# Patient Record
Sex: FEMALE | Race: ASIAN | NOT HISPANIC OR LATINO | URBAN - METROPOLITAN AREA
[De-identification: names, ages, dates, MRNs, and addresses within clinical notes are randomized per-mention and may not be internally consistent; named-entity substitution may affect disease eponyms.]

---

## 2022-12-03 ENCOUNTER — EMERGENCY (EMERGENCY)
Facility: HOSPITAL | Age: 37
LOS: 1 days | Discharge: HOME | End: 2022-12-03
Attending: EMERGENCY MEDICINE | Admitting: EMERGENCY MEDICINE

## 2022-12-03 VITALS
RESPIRATION RATE: 16 BRPM | WEIGHT: 166.01 LBS | OXYGEN SATURATION: 96 % | DIASTOLIC BLOOD PRESSURE: 90 MMHG | SYSTOLIC BLOOD PRESSURE: 138 MMHG | HEART RATE: 88 BPM | TEMPERATURE: 98 F

## 2022-12-03 VITALS
SYSTOLIC BLOOD PRESSURE: 125 MMHG | TEMPERATURE: 98 F | HEART RATE: 85 BPM | RESPIRATION RATE: 16 BRPM | OXYGEN SATURATION: 95 % | DIASTOLIC BLOOD PRESSURE: 85 MMHG

## 2022-12-03 LAB
ALBUMIN SERPL ELPH-MCNC: 4.1 G/DL — SIGNIFICANT CHANGE UP (ref 3.4–5)
ALP SERPL-CCNC: 61 U/L — SIGNIFICANT CHANGE UP (ref 40–120)
ALT FLD-CCNC: 25 U/L — SIGNIFICANT CHANGE UP (ref 12–42)
ANION GAP SERPL CALC-SCNC: 8 MMOL/L — LOW (ref 9–16)
AST SERPL-CCNC: 19 U/L — SIGNIFICANT CHANGE UP (ref 15–37)
B PERT DNA SPEC QL NAA+PROBE: SIGNIFICANT CHANGE UP
BASOPHILS # BLD AUTO: 0.03 K/UL — SIGNIFICANT CHANGE UP (ref 0–0.2)
BASOPHILS NFR BLD AUTO: 0.5 % — SIGNIFICANT CHANGE UP (ref 0–2)
BILIRUB SERPL-MCNC: 0.7 MG/DL — SIGNIFICANT CHANGE UP (ref 0.2–1.2)
BUN SERPL-MCNC: 6 MG/DL — LOW (ref 7–23)
C PNEUM DNA SPEC QL NAA+PROBE: SIGNIFICANT CHANGE UP
CALCIUM SERPL-MCNC: 9.1 MG/DL — SIGNIFICANT CHANGE UP (ref 8.5–10.5)
CHLORIDE SERPL-SCNC: 103 MMOL/L — SIGNIFICANT CHANGE UP (ref 96–108)
CO2 SERPL-SCNC: 27 MMOL/L — SIGNIFICANT CHANGE UP (ref 22–31)
CREAT SERPL-MCNC: 0.74 MG/DL — SIGNIFICANT CHANGE UP (ref 0.5–1.3)
EGFR: 105 ML/MIN/1.73M2 — SIGNIFICANT CHANGE UP
EOSINOPHIL # BLD AUTO: 0.04 K/UL — SIGNIFICANT CHANGE UP (ref 0–0.5)
EOSINOPHIL NFR BLD AUTO: 0.6 % — SIGNIFICANT CHANGE UP (ref 0–6)
FLUAV AG NPH QL: SIGNIFICANT CHANGE UP
FLUAV H1 2009 PAND RNA SPEC QL NAA+PROBE: SIGNIFICANT CHANGE UP
FLUAV H1 RNA SPEC QL NAA+PROBE: SIGNIFICANT CHANGE UP
FLUAV H3 RNA SPEC QL NAA+PROBE: SIGNIFICANT CHANGE UP
FLUAV SUBTYP SPEC NAA+PROBE: SIGNIFICANT CHANGE UP
FLUBV AG NPH QL: SIGNIFICANT CHANGE UP
FLUBV RNA SPEC QL NAA+PROBE: SIGNIFICANT CHANGE UP
GLUCOSE SERPL-MCNC: 110 MG/DL — HIGH (ref 70–99)
HADV DNA SPEC QL NAA+PROBE: SIGNIFICANT CHANGE UP
HCG SERPL-ACNC: <1 MIU/ML — SIGNIFICANT CHANGE UP
HCOV PNL SPEC NAA+PROBE: SIGNIFICANT CHANGE UP
HCT VFR BLD CALC: 43.2 % — SIGNIFICANT CHANGE UP (ref 34.5–45)
HGB BLD-MCNC: 14.1 G/DL — SIGNIFICANT CHANGE UP (ref 11.5–15.5)
HMPV RNA SPEC QL NAA+PROBE: SIGNIFICANT CHANGE UP
HPIV1 RNA SPEC QL NAA+PROBE: SIGNIFICANT CHANGE UP
HPIV2 RNA SPEC QL NAA+PROBE: SIGNIFICANT CHANGE UP
HPIV3 RNA SPEC QL NAA+PROBE: SIGNIFICANT CHANGE UP
HPIV4 RNA SPEC QL NAA+PROBE: SIGNIFICANT CHANGE UP
IMM GRANULOCYTES NFR BLD AUTO: 0.3 % — SIGNIFICANT CHANGE UP (ref 0–0.9)
LIDOCAIN IGE QN: 103 U/L — SIGNIFICANT CHANGE UP (ref 73–393)
LYMPHOCYTES # BLD AUTO: 1.38 K/UL — SIGNIFICANT CHANGE UP (ref 1–3.3)
LYMPHOCYTES # BLD AUTO: 21.6 % — SIGNIFICANT CHANGE UP (ref 13–44)
MCHC RBC-ENTMCNC: 30.3 PG — SIGNIFICANT CHANGE UP (ref 27–34)
MCHC RBC-ENTMCNC: 32.6 GM/DL — SIGNIFICANT CHANGE UP (ref 32–36)
MCV RBC AUTO: 92.7 FL — SIGNIFICANT CHANGE UP (ref 80–100)
MONOCYTES # BLD AUTO: 0.37 K/UL — SIGNIFICANT CHANGE UP (ref 0–0.9)
MONOCYTES NFR BLD AUTO: 5.8 % — SIGNIFICANT CHANGE UP (ref 2–14)
NEUTROPHILS # BLD AUTO: 4.55 K/UL — SIGNIFICANT CHANGE UP (ref 1.8–7.4)
NEUTROPHILS NFR BLD AUTO: 71.2 % — SIGNIFICANT CHANGE UP (ref 43–77)
NRBC # BLD: 0 /100 WBCS — SIGNIFICANT CHANGE UP (ref 0–0)
NT-PROBNP SERPL-SCNC: 33 PG/ML — SIGNIFICANT CHANGE UP
PLATELET # BLD AUTO: 407 K/UL — HIGH (ref 150–400)
POTASSIUM SERPL-MCNC: 3.7 MMOL/L — SIGNIFICANT CHANGE UP (ref 3.5–5.3)
POTASSIUM SERPL-SCNC: 3.7 MMOL/L — SIGNIFICANT CHANGE UP (ref 3.5–5.3)
PROT SERPL-MCNC: 7.6 G/DL — SIGNIFICANT CHANGE UP (ref 6.4–8.2)
RAPID RVP RESULT: SIGNIFICANT CHANGE UP
RBC # BLD: 4.66 M/UL — SIGNIFICANT CHANGE UP (ref 3.8–5.2)
RBC # FLD: 12 % — SIGNIFICANT CHANGE UP (ref 10.3–14.5)
RSV RNA NPH QL NAA+NON-PROBE: SIGNIFICANT CHANGE UP
RSV RNA SPEC QL NAA+PROBE: SIGNIFICANT CHANGE UP
RV+EV RNA SPEC QL NAA+PROBE: SIGNIFICANT CHANGE UP
SARS-COV-2 RNA SPEC QL NAA+PROBE: SIGNIFICANT CHANGE UP
SARS-COV-2 RNA SPEC QL NAA+PROBE: SIGNIFICANT CHANGE UP
SODIUM SERPL-SCNC: 138 MMOL/L — SIGNIFICANT CHANGE UP (ref 132–145)
TROPONIN I, HIGH SENSITIVITY RESULT: 5.1 NG/L — SIGNIFICANT CHANGE UP
TSH SERPL-MCNC: 4.34 UIU/ML — HIGH (ref 0.36–3.74)
WBC # BLD: 6.39 K/UL — SIGNIFICANT CHANGE UP (ref 3.8–10.5)
WBC # FLD AUTO: 6.39 K/UL — SIGNIFICANT CHANGE UP (ref 3.8–10.5)

## 2022-12-03 PROCEDURE — 71046 X-RAY EXAM CHEST 2 VIEWS: CPT | Mod: 26

## 2022-12-03 PROCEDURE — 70491 CT SOFT TISSUE NECK W/DYE: CPT | Mod: 26

## 2022-12-03 PROCEDURE — 99284 EMERGENCY DEPT VISIT MOD MDM: CPT | Mod: 25

## 2022-12-03 PROCEDURE — 93010 ELECTROCARDIOGRAM REPORT: CPT

## 2022-12-03 NOTE — ED PROVIDER NOTE - PROGRESS NOTE DETAILS
CT shows no thyroid abn.  Shows mild tonsillar swelling, mild salivary gland swelling.  Likely reactive LN on R side.  Will refer to ENT per pt preference as she is still concerned about chronic snoring which has been going on since the summer.  Mono test, RVP, and strep culture sent.  Stable for dc.

## 2022-12-03 NOTE — ED ADULT NURSE NOTE - OBJECTIVE STATEMENT
Pt walked in c/o of "enlarged thyroid" x " since the summer after I got sick with the cold". Pt reports seeing pcp yesterday and was told to come to the ED for any concerns. Pt arrives today reporting bilateral numbness to the hands this morning "because of my anxiety. I think I have cancer". Numbness has subsided. Pt denies never/chills, sob, cp, n/v/d, headache/dizziness. BEFAST negative.

## 2022-12-03 NOTE — ED ADULT TRIAGE NOTE - CHIEF COMPLAINT QUOTE
Pt reports waking up this morning c/o b/l feet swelling, and arm tingling, recently saw her pmd and is concerned about abnormal test results and enlarged thyroid, cough x 1 month

## 2022-12-03 NOTE — ED PROVIDER NOTE - OBJECTIVE STATEMENT
38 y/o F with no PMH presents to the ED for multiple medical complaints. Pt reports she was having difficulty breathing and swallowing 2 nights ago. She saw her PCP who told her that her thyroid appeared to be swollen. Pt had blood work done at the time and results returned this morning that showed an "abnormal value for platelets." She was experiencing B/L hand tingling this morning and was concerned for a "blood clot." Pt went to the ED afterwards for evaluation. Pt also states she has been having coughs and some shortness of breath that has not resolved since onset about 2-3 weeks ago. Pt denies fevers, chills, CP, or LE edema.

## 2022-12-03 NOTE — ED PROVIDER NOTE - PATIENT PORTAL LINK FT
You can access the FollowMyHealth Patient Portal offered by [facility] by registering at the following website: [email or web address]. By joining [employer]’s FollowMyHealth portal, you will also be able to view your health information using other applications (apps) compatible with our system.

## 2022-12-03 NOTE — ED PROVIDER NOTE - NSFOLLOWUPINSTRUCTIONS_ED_ALL_ED_FT
Follow up with ENT for further evaluation.      Tonsillitis    An open mouth with the tongue lifted showing the tonsils.   Tonsillitis is an infection of the throat. Tonsils are tissues in the back of your throat. This infection causes the tonsils to become red, tender, and swollen.      What are the causes?    Tonsillitis is caused by germs (bacteria or a virus). This condition can also occur when pieces of food and bacteria build up around the tonsils.    Tonsillitis that is caused by germs can spread from person to person.      What are the signs or symptoms?    •A sore throat.       •Trouble swallowing.       •White patches on the tonsils.       •Swollen tonsils.       •Fever.       •Headache.       •Tiredness.       •Not feeling hungry.      • Snoring during sleep when you did not snore before.       •Foul-smelling, yellowish-white pieces of material that you cough up or spit out. These can cause bad breath.        How is this treated?    •Medicines. These can be given to treat pain, swelling, or fever. They can also be given to kill bacteria.      •Surgery to take out the tonsils. This is done if you have very bad infections that do not go away.        Follow these instructions at home:    Medicines     •Take over-the-counter and prescription medicines only as told by your doctor.      •If you were prescribed an antibiotic medicine, take it as told by your doctor. Do not stop taking the antibiotic even if you start to feel better.      Eating and drinking     •Drink enough fluid to keep your pee (urine) pale yellow.    •While your throat is sore, eat soft or liquid foods, such as:  •Soup.      •Sherbet.      •Soft, warm cereals, such as oatmeal or hot wheat cereal.        •Drink warm fluids.      •Eat frozen ice pops.      General instructions     •Rest as much as you can, and get plenty of sleep.    •Rinse your mouth often with salt water.   •To make salt water, dissolve ½–1 tsp (3–6 g) of salt in 1 cup (237 mL) of warm water.      •Do not swallow the salt water.        •Wash your hands often with soap and water for at least 20 seconds. If there is no soap and water, use hand .      • Do not share cups, bottles, or other utensils until your symptoms are gone.      • Do not smoke or use any products that contain nicotine or tobacco. If you need help quitting, ask your doctor.      •Keep all follow-up visits.        Contact a doctor if:    •You have large, tender lumps in your neck that are new.      •You have a fever that does not go away after 2–3 days.      •You have a rash.      •You cough up green, yellow-brown, or bloody fluid.      •You cannot swallow liquids or food for 24 hours.      •Only one of your tonsils is swollen.        Get help right away if:  •You have any new symptoms such as:  •Vomiting.      •Very bad headache.      •Stiff neck.      •Chest pain.      •Trouble breathing or swallowing.        •You have very bad throat pain, and you also have drooling or voice changes.      •You have very bad pain that is not helped by medicine.      •You cannot fully open your mouth.      •You have redness, swelling, or very bad pain anywhere in your neck.        Summary    •Tonsillitis is an infection of the throat. It causes your tonsils to be red, tender, and swollen.      •While your throat is sore, eat soft or liquid foods.      •Rinse your mouth often with salt water.      • Do not share cups, bottles, or other utensils until your symptoms are gone.      This information is not intended to replace advice given to you by your health care provider. Make sure you discuss any questions you have with your health care provider.

## 2022-12-03 NOTE — ED PROVIDER NOTE - CARE PROVIDERS DIRECT ADDRESSES
1/18/2018         RE: Kayleigh Rocha  [id number]  [facility] [address]        Dear Colleague,    Thank you for referring your patient, Kayleigh Rocha, to the [facility]. Please see a copy of my visit note below.    No show    Again, thank you for allowing me to participate in the care of your patient.        Sincerely,        Pilar Presley NP, APRN CNP     [email or web address]

## 2022-12-03 NOTE — ED PROVIDER NOTE - CARE PROVIDER_API CALL
Alejandro Alva)  Otolaryngology  [address] [facility], [address]nd [place] [address]  Phone: [phone]  Fax: [phone]  Follow Up Time:

## 2022-12-04 ENCOUNTER — EMERGENCY (EMERGENCY)
Facility: HOSPITAL | Age: 37
LOS: 1 days | Discharge: HOME | End: 2022-12-04
Admitting: EMERGENCY MEDICINE

## 2022-12-04 VITALS
OXYGEN SATURATION: 97 % | TEMPERATURE: 98 F | HEART RATE: 99 BPM | DIASTOLIC BLOOD PRESSURE: 94 MMHG | WEIGHT: 164.91 LBS | RESPIRATION RATE: 20 BRPM | SYSTOLIC BLOOD PRESSURE: 158 MMHG

## 2022-12-04 VITALS
TEMPERATURE: 99 F | DIASTOLIC BLOOD PRESSURE: 83 MMHG | SYSTOLIC BLOOD PRESSURE: 127 MMHG | HEART RATE: 96 BPM | OXYGEN SATURATION: 98 % | RESPIRATION RATE: 18 BRPM

## 2022-12-04 DIAGNOSIS — R05.9 COUGH, UNSPECIFIED: ICD-10-CM

## 2022-12-04 DIAGNOSIS — R06.02 SHORTNESS OF BREATH: ICD-10-CM

## 2022-12-04 DIAGNOSIS — R22.1 LOCALIZED SWELLING, MASS AND LUMP, NECK: ICD-10-CM

## 2022-12-04 DIAGNOSIS — R07.0 PAIN IN THROAT: ICD-10-CM

## 2022-12-04 DIAGNOSIS — Z20.822 CONTACT WITH AND (SUSPECTED) EXPOSURE TO COVID-19: ICD-10-CM

## 2022-12-04 DIAGNOSIS — R06.00 DYSPNEA, UNSPECIFIED: ICD-10-CM

## 2022-12-04 PROBLEM — Z78.9 OTHER SPECIFIED HEALTH STATUS: Chronic | Status: ACTIVE | Noted: 2022-12-03

## 2022-12-04 LAB
ALBUMIN SERPL ELPH-MCNC: 4.2 G/DL — SIGNIFICANT CHANGE UP (ref 3.4–5)
ALP SERPL-CCNC: 61 U/L — SIGNIFICANT CHANGE UP (ref 40–120)
ALT FLD-CCNC: 18 U/L — SIGNIFICANT CHANGE UP (ref 12–42)
ANION GAP SERPL CALC-SCNC: 10 MMOL/L — SIGNIFICANT CHANGE UP (ref 9–16)
AST SERPL-CCNC: 21 U/L — SIGNIFICANT CHANGE UP (ref 15–37)
BILIRUB SERPL-MCNC: 0.5 MG/DL — SIGNIFICANT CHANGE UP (ref 0.2–1.2)
BUN SERPL-MCNC: 8 MG/DL — SIGNIFICANT CHANGE UP (ref 7–23)
CALCIUM SERPL-MCNC: 9.3 MG/DL — SIGNIFICANT CHANGE UP (ref 8.5–10.5)
CHLORIDE SERPL-SCNC: 102 MMOL/L — SIGNIFICANT CHANGE UP (ref 96–108)
CO2 SERPL-SCNC: 26 MMOL/L — SIGNIFICANT CHANGE UP (ref 22–31)
CREAT SERPL-MCNC: 0.83 MG/DL — SIGNIFICANT CHANGE UP (ref 0.5–1.3)
D DIMER BLD IA.RAPID-MCNC: 217 NG/ML DDU — SIGNIFICANT CHANGE UP
EGFR: 92 ML/MIN/1.73M2 — SIGNIFICANT CHANGE UP
GLUCOSE SERPL-MCNC: 110 MG/DL — HIGH (ref 70–99)
HCT VFR BLD CALC: 41.8 % — SIGNIFICANT CHANGE UP (ref 34.5–45)
HGB BLD-MCNC: 14.2 G/DL — SIGNIFICANT CHANGE UP (ref 11.5–15.5)
MCHC RBC-ENTMCNC: 30.1 PG — SIGNIFICANT CHANGE UP (ref 27–34)
MCHC RBC-ENTMCNC: 34 GM/DL — SIGNIFICANT CHANGE UP (ref 32–36)
MCV RBC AUTO: 88.7 FL — SIGNIFICANT CHANGE UP (ref 80–100)
NRBC # BLD: 0 /100 WBCS — SIGNIFICANT CHANGE UP (ref 0–0)
PCO2 BLDV: 39 MMHG — SIGNIFICANT CHANGE UP (ref 39–42)
PH BLDV: 7.43 — SIGNIFICANT CHANGE UP (ref 7.32–7.43)
PLATELET # BLD AUTO: 420 K/UL — HIGH (ref 150–400)
PO2 BLDV: <35 MMHG — SIGNIFICANT CHANGE UP (ref 25–45)
POTASSIUM SERPL-MCNC: 3.7 MMOL/L — SIGNIFICANT CHANGE UP (ref 3.5–5.3)
POTASSIUM SERPL-SCNC: 3.7 MMOL/L — SIGNIFICANT CHANGE UP (ref 3.5–5.3)
PROT SERPL-MCNC: 8 G/DL — SIGNIFICANT CHANGE UP (ref 6.4–8.2)
RBC # BLD: 4.71 M/UL — SIGNIFICANT CHANGE UP (ref 3.8–5.2)
RBC # FLD: 12.1 % — SIGNIFICANT CHANGE UP (ref 10.3–14.5)
SAO2 % BLDV: 43.6 % — LOW (ref 67–88)
SODIUM SERPL-SCNC: 138 MMOL/L — SIGNIFICANT CHANGE UP (ref 132–145)
TROPONIN I, HIGH SENSITIVITY RESULT: 5.2 NG/L — SIGNIFICANT CHANGE UP
WBC # BLD: 6.11 K/UL — SIGNIFICANT CHANGE UP (ref 3.8–10.5)
WBC # FLD AUTO: 6.11 K/UL — SIGNIFICANT CHANGE UP (ref 3.8–10.5)

## 2022-12-04 PROCEDURE — 99285 EMERGENCY DEPT VISIT HI MDM: CPT

## 2022-12-04 PROCEDURE — 99053 MED SERV 10PM-8AM 24 HR FAC: CPT

## 2022-12-04 NOTE — ED ADULT NURSE NOTE - OBJECTIVE STATEMENT
37y female presents to ED c/o shortness of breath. States has been ongoing over the last few weeks. Pt expresses concern for exposure to resin. Recently evaluated in this ED for similar sx.

## 2022-12-04 NOTE — ED ADULT NURSE REASSESSMENT NOTE - NS ED NURSE REASSESS COMMENT FT1
Pt received from Marivel CENTENO. Pt resting comfortably in bed. No s/s of acute distress. Will continue to monitor

## 2022-12-04 NOTE — ED PROVIDER NOTE - NS ED ROS FT
Constitutional:  no fever, no chills  Eyes:  no discharge, no irritations, no pain, no redness, visual changes  Ears:  no ear pain, no ear drainage,  no hearing problems  Nose:  no nasal congestion, no nasal drainage  Mouth/Throat:  no hoarseness and no throat pain  Neck:  no stiffness, no pain, no lumps, no swollen glands  Cardiac:  no chest pain, no edema  Respiratory:  no cough, + shortness of breath  GI: no abdominal pain, no bloating, no constipation, no diarrhea, no nausea, no vomiting  :  no dysuria, no urinary frequency, no hematuria, no discharge  MSK:  no back pain, no msk pain, no weakness  NEURO:  no headache, no weakness, no numbness  Skin:  no lesions, no pruritis, no jaundice, no bruising, no rash  Psych:  no known mental health issues  Endocrine:  no diabetes, no thyroid issues

## 2022-12-04 NOTE — ED PROVIDER NOTE - CLINICAL SUMMARY MEDICAL DECISION MAKING FREE TEXT BOX
37-year-old female presents to the emergency department complaining of shortness of breath. patient well-appearing, vital stable, no acute distress.  Patient afebrile.  Labs, D-dimer, EKG unremarkable.  Patient has normal chest x-ray yesterday.  Results discussed with patient will refer to pulmonology for further evaluation.

## 2022-12-04 NOTE — ED ADULT TRIAGE NOTE - CHIEF COMPLAINT QUOTE
Pt presents to ed reporting worsening SOB ongoing since yesterday, recently seen in ED and d/carissa, but reports that it got worse overnight and hasn't been able to sleep. Speaking in full sentences

## 2022-12-04 NOTE — ED PROVIDER NOTE - NSFOLLOWUPINSTRUCTIONS_ED_ALL_ED_FT
Shortness of breath    Shortness of breath (dyspnea) means you have trouble breathing and could indicate a medical problem. Causes include lung disease, heart disease, low amount of red blood cells (anemia), poor physical fitness, being overweight, smoking, etc. Your health care provider today may not be able to find a cause for your shortness of breath after your exam. In this case, it is important to have a follow-up exam with your primary care physician as instructed. If medicines were prescribed, take them as directed for the full length of time directed. Refrain from tobacco products.    SEEK IMMEDIATE MEDICAL CARE IF YOU HAVE ANY OF THE FOLLOWING SYMPTOMS: worsening shortness of breath, chest pain, back pain, abdominal pain, fever, coughing up blood, lightheadedness/dizziness.      FOLLOW UP WITH ENT AND PULMONOLOGY.  CALL FOR FOLLOW UP APPOINTMENTS.

## 2022-12-04 NOTE — ED PROVIDER NOTE - OBJECTIVE STATEMENT
37-year-old female, no past medical history, presents to the emergency department complaining of shortness of breath described as "feeling like I cannot breathe out".  Patient states she has had a residual cough for several months after URI this past summer.  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  Patient states she has had a residual cough for several months after URI this past summer.  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  Pt seen in this ED for difficulty breathing and R throat pain.  Pt has nl labs and negative RVP.

## 2022-12-04 NOTE — ED PROVIDER NOTE - CARE PROVIDER_API CALL
Viry Duong)  Critical Care Medicine; Internal Medicine; Pulmonary Disease  [place] [address] [address] [place] [address]  Phone: [phone]  Fax: [phone]  Follow Up Time:

## 2022-12-04 NOTE — ED PROVIDER NOTE - PATIENT PORTAL LINK FT
You can access the FollowMyHealth Patient Portal offered by [facility] by registering at the following website: [email or web address]. By joining [employer]’s FollowMyHealth portal, you will also be able to view your health information using other applications (apps) compatible with our system.

## 2022-12-05 LAB
CULTURE RESULTS: SIGNIFICANT CHANGE UP
SPECIMEN SOURCE: SIGNIFICANT CHANGE UP

## 2022-12-09 LAB — HETEROPH AB TITR SER AGGL: NEGATIVE — SIGNIFICANT CHANGE UP

## 2023-10-04 ENCOUNTER — EMERGENCY (EMERGENCY)
Facility: HOSPITAL | Age: 38
LOS: 1 days | Discharge: HOME | End: 2023-10-04
Attending: EMERGENCY MEDICINE | Admitting: EMERGENCY MEDICINE
Payer: COMMERCIAL

## 2023-10-04 VITALS
TEMPERATURE: 98 F | RESPIRATION RATE: 20 BRPM | WEIGHT: 160.06 LBS | DIASTOLIC BLOOD PRESSURE: 100 MMHG | HEART RATE: 114 BPM | SYSTOLIC BLOOD PRESSURE: 160 MMHG | OXYGEN SATURATION: 98 % | HEIGHT: 61 IN

## 2023-10-04 DIAGNOSIS — R10.30 LOWER ABDOMINAL PAIN, UNSPECIFIED: ICD-10-CM

## 2023-10-04 DIAGNOSIS — Z87.891 PERSONAL HISTORY OF NICOTINE DEPENDENCE: ICD-10-CM

## 2023-10-04 DIAGNOSIS — R07.81 PLEURODYNIA: ICD-10-CM

## 2023-10-04 DIAGNOSIS — R09.1 PLEURISY: ICD-10-CM

## 2023-10-04 DIAGNOSIS — Z20.822 CONTACT WITH AND (SUSPECTED) EXPOSURE TO COVID-19: ICD-10-CM

## 2023-10-04 DIAGNOSIS — R05.9 COUGH, UNSPECIFIED: ICD-10-CM

## 2023-10-04 DIAGNOSIS — R06.02 SHORTNESS OF BREATH: ICD-10-CM

## 2023-10-04 LAB
ALBUMIN SERPL ELPH-MCNC: 3.7 G/DL — SIGNIFICANT CHANGE UP (ref 3.4–5)
ALP SERPL-CCNC: 50 U/L — SIGNIFICANT CHANGE UP (ref 40–120)
ALT FLD-CCNC: 28 U/L — SIGNIFICANT CHANGE UP (ref 12–42)
ANION GAP SERPL CALC-SCNC: 8 MMOL/L — LOW (ref 9–16)
APTT BLD: 28.1 SEC — SIGNIFICANT CHANGE UP (ref 24.5–35.6)
AST SERPL-CCNC: 32 U/L — SIGNIFICANT CHANGE UP (ref 15–37)
BASOPHILS # BLD AUTO: 0.04 K/UL — SIGNIFICANT CHANGE UP (ref 0–0.2)
BASOPHILS NFR BLD AUTO: 0.6 % — SIGNIFICANT CHANGE UP (ref 0–2)
BILIRUB SERPL-MCNC: 0.4 MG/DL — SIGNIFICANT CHANGE UP (ref 0.2–1.2)
BUN SERPL-MCNC: 11 MG/DL — SIGNIFICANT CHANGE UP (ref 7–23)
CALCIUM SERPL-MCNC: 8.9 MG/DL — SIGNIFICANT CHANGE UP (ref 8.5–10.5)
CHLORIDE SERPL-SCNC: 105 MMOL/L — SIGNIFICANT CHANGE UP (ref 96–108)
CO2 SERPL-SCNC: 27 MMOL/L — SIGNIFICANT CHANGE UP (ref 22–31)
CREAT SERPL-MCNC: 0.88 MG/DL — SIGNIFICANT CHANGE UP (ref 0.5–1.3)
D DIMER BLD IA.RAPID-MCNC: <187 NG/ML DDU — SIGNIFICANT CHANGE UP
EGFR: 85 ML/MIN/1.73M2 — SIGNIFICANT CHANGE UP
EOSINOPHIL # BLD AUTO: 0.09 K/UL — SIGNIFICANT CHANGE UP (ref 0–0.5)
EOSINOPHIL NFR BLD AUTO: 1.4 % — SIGNIFICANT CHANGE UP (ref 0–6)
FLUAV AG NPH QL: SIGNIFICANT CHANGE UP
FLUBV AG NPH QL: SIGNIFICANT CHANGE UP
GLUCOSE SERPL-MCNC: 132 MG/DL — HIGH (ref 70–99)
HCT VFR BLD CALC: 39.7 % — SIGNIFICANT CHANGE UP (ref 34.5–45)
HGB BLD-MCNC: 13.2 G/DL — SIGNIFICANT CHANGE UP (ref 11.5–15.5)
IMM GRANULOCYTES NFR BLD AUTO: 0.3 % — SIGNIFICANT CHANGE UP (ref 0–0.9)
INR BLD: 1.01 — SIGNIFICANT CHANGE UP (ref 0.85–1.18)
LYMPHOCYTES # BLD AUTO: 1.78 K/UL — SIGNIFICANT CHANGE UP (ref 1–3.3)
LYMPHOCYTES # BLD AUTO: 27.6 % — SIGNIFICANT CHANGE UP (ref 13–44)
MCHC RBC-ENTMCNC: 29.9 PG — SIGNIFICANT CHANGE UP (ref 27–34)
MCHC RBC-ENTMCNC: 33.2 GM/DL — SIGNIFICANT CHANGE UP (ref 32–36)
MCV RBC AUTO: 89.8 FL — SIGNIFICANT CHANGE UP (ref 80–100)
MONOCYTES # BLD AUTO: 0.53 K/UL — SIGNIFICANT CHANGE UP (ref 0–0.9)
MONOCYTES NFR BLD AUTO: 8.2 % — SIGNIFICANT CHANGE UP (ref 2–14)
NEUTROPHILS # BLD AUTO: 4 K/UL — SIGNIFICANT CHANGE UP (ref 1.8–7.4)
NEUTROPHILS NFR BLD AUTO: 61.9 % — SIGNIFICANT CHANGE UP (ref 43–77)
NRBC # BLD: 0 /100 WBCS — SIGNIFICANT CHANGE UP (ref 0–0)
PLATELET # BLD AUTO: 427 K/UL — HIGH (ref 150–400)
POTASSIUM SERPL-MCNC: 3.9 MMOL/L — SIGNIFICANT CHANGE UP (ref 3.5–5.3)
POTASSIUM SERPL-SCNC: 3.9 MMOL/L — SIGNIFICANT CHANGE UP (ref 3.5–5.3)
PROT SERPL-MCNC: 7.3 G/DL — SIGNIFICANT CHANGE UP (ref 6.4–8.2)
PROTHROM AB SERPL-ACNC: 11.1 SEC — SIGNIFICANT CHANGE UP (ref 9.5–13)
RBC # BLD: 4.42 M/UL — SIGNIFICANT CHANGE UP (ref 3.8–5.2)
RBC # FLD: 12.5 % — SIGNIFICANT CHANGE UP (ref 10.3–14.5)
RSV RNA NPH QL NAA+NON-PROBE: SIGNIFICANT CHANGE UP
SARS-COV-2 RNA SPEC QL NAA+PROBE: SIGNIFICANT CHANGE UP
SODIUM SERPL-SCNC: 140 MMOL/L — SIGNIFICANT CHANGE UP (ref 132–145)
WBC # BLD: 6.46 K/UL — SIGNIFICANT CHANGE UP (ref 3.8–10.5)
WBC # FLD AUTO: 6.46 K/UL — SIGNIFICANT CHANGE UP (ref 3.8–10.5)

## 2023-10-04 PROCEDURE — 99285 EMERGENCY DEPT VISIT HI MDM: CPT

## 2023-10-04 RX ORDER — DEXAMETHASONE 0.5 MG/5ML
10 ELIXIR ORAL ONCE
Refills: 0 | Status: COMPLETED | OUTPATIENT
Start: 2023-10-04 | End: 2023-10-04

## 2023-10-04 RX ORDER — KETOROLAC TROMETHAMINE 30 MG/ML
15 SYRINGE (ML) INJECTION ONCE
Refills: 0 | Status: DISCONTINUED | OUTPATIENT
Start: 2023-10-04 | End: 2023-10-04

## 2023-10-04 RX ORDER — IPRATROPIUM/ALBUTEROL SULFATE 18-103MCG
3 AEROSOL WITH ADAPTER (GRAM) INHALATION ONCE
Refills: 0 | Status: COMPLETED | OUTPATIENT
Start: 2023-10-04 | End: 2023-10-04

## 2023-10-04 NOTE — ED ADULT TRIAGE NOTE - CHIEF COMPLAINT QUOTE
Walk in pt with complaints of dyspnea and rib pain for a week. States this has happened to her before and has not been able to get a diagnosis of what she is experiencing. Has seen her pmd as well as pulmonologist without success. Pt has no difficulty breathing or speaking in triage. O2 saturation is 96% RA, RR even equal and unlabored.

## 2023-10-04 NOTE — ED ADULT NURSE NOTE - OBJECTIVE STATEMENT
Pt is A&OX4. Pt c/o dyspnea and B/L rib pain x1 week. Pt states that has been a reoccurring issue. 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 Pt states that dyspnea usually comes after she has a cold and experiences a cough that last for several days to weeks. Pt states that when she breaths she feels like the skin around her ribs are being suctioned inward. Pt also states she has been experiencing intermittent nausea and vomiting for several months.

## 2023-10-05 VITALS
HEART RATE: 94 BPM | RESPIRATION RATE: 18 BRPM | DIASTOLIC BLOOD PRESSURE: 85 MMHG | OXYGEN SATURATION: 96 % | SYSTOLIC BLOOD PRESSURE: 135 MMHG | TEMPERATURE: 99 F

## 2023-10-05 LAB
APPEARANCE UR: CLEAR — SIGNIFICANT CHANGE UP
BACTERIA # UR AUTO: PRESENT /HPF — SIGNIFICANT CHANGE UP
BILIRUB UR-MCNC: NEGATIVE — SIGNIFICANT CHANGE UP
COLOR SPEC: YELLOW — SIGNIFICANT CHANGE UP
COMMENT - URINE: SIGNIFICANT CHANGE UP
DIFF PNL FLD: ABNORMAL
EPI CELLS # UR: 9 — SIGNIFICANT CHANGE UP
GLUCOSE UR QL: NEGATIVE MG/DL — SIGNIFICANT CHANGE UP
GRAN CASTS # UR COMP ASSIST: SIGNIFICANT CHANGE UP
HCG SERPL-ACNC: <1 MIU/ML — SIGNIFICANT CHANGE UP
HCG UR QL: NEGATIVE — SIGNIFICANT CHANGE UP
HYALINE CASTS # UR AUTO: SIGNIFICANT CHANGE UP
KETONES UR-MCNC: 15 MG/DL
LEUKOCYTE ESTERASE UR-ACNC: NEGATIVE — SIGNIFICANT CHANGE UP
NITRITE UR-MCNC: NEGATIVE — SIGNIFICANT CHANGE UP
PH UR: 7.5 — SIGNIFICANT CHANGE UP (ref 5–8)
PROT UR-MCNC: ABNORMAL MG/DL
RBC CASTS # UR COMP ASSIST: 1 /HPF — SIGNIFICANT CHANGE UP (ref 0–4)
SP GR SPEC: 1.02 — SIGNIFICANT CHANGE UP (ref 1–1.03)
TSH SERPL-MCNC: 9.57 UIU/ML — HIGH (ref 0.36–3.74)
UROBILINOGEN FLD QL: 1 MG/DL — SIGNIFICANT CHANGE UP (ref 0.2–1)
WBC UR QL: 3 /HPF — SIGNIFICANT CHANGE UP (ref 0–5)

## 2023-10-05 PROCEDURE — 74177 CT ABD & PELVIS W/CONTRAST: CPT | Mod: 26

## 2023-10-05 PROCEDURE — 71275 CT ANGIOGRAPHY CHEST: CPT | Mod: 26

## 2023-10-05 RX ORDER — LIDOCAINE 4 G/100G
1 CREAM TOPICAL
Qty: 7 | Refills: 0
Start: 2023-10-05 | End: 2023-10-11

## 2023-10-05 RX ORDER — FLUTICASONE PROPIONATE AND SALMETEROL 50; 250 UG/1; UG/1
1 POWDER ORAL; RESPIRATORY (INHALATION)
Qty: 1 | Refills: 0
Start: 2023-10-05 | End: 2023-11-03

## 2023-10-05 RX ORDER — LIDOCAINE 4 G/100G
2 CREAM TOPICAL ONCE
Refills: 0 | Status: COMPLETED | OUTPATIENT
Start: 2023-10-05 | End: 2023-10-05

## 2023-10-05 RX ORDER — KETOROLAC TROMETHAMINE 30 MG/ML
15 SYRINGE (ML) INJECTION ONCE
Refills: 0 | Status: DISCONTINUED | OUTPATIENT
Start: 2023-10-05 | End: 2023-10-05

## 2023-10-05 RX ORDER — ALBUTEROL 90 UG/1
2 AEROSOL, METERED ORAL
Qty: 1 | Refills: 0
Start: 2023-10-05 | End: 2023-10-11

## 2023-10-05 RX ADMIN — Medication 3 MILLILITER(S): at 00:48

## 2023-10-05 RX ADMIN — Medication 15 MILLIGRAM(S): at 00:45

## 2023-10-05 RX ADMIN — Medication 3 MILLILITER(S): at 00:44

## 2023-10-05 RX ADMIN — Medication 15 MILLIGRAM(S): at 05:28

## 2023-10-05 RX ADMIN — LIDOCAINE 2 PATCH: 4 CREAM TOPICAL at 05:27

## 2023-10-05 RX ADMIN — Medication 102 MILLIGRAM(S): at 00:45

## 2023-10-05 NOTE — ED PROVIDER NOTE - RESPIRATORY CHEST EXAM
No retraction no accessory muscle use, Mild tenderness to palpation over the lateral ribs there is no visualized overlying skin vesicles or rash/normal No retractions no accessory muscle use no rash appreciated bilaterally/normal

## 2023-10-05 NOTE — ED PROVIDER NOTE - CLINICAL SUMMARY MEDICAL DECISION MAKING FREE TEXT BOX
Patient with past medical history of hypertension depression not on any medications at this time presents with left-sided pleuritic posterior lung pain lateral pain and lateral chest discomfort there is no overlying rash to suggest shingles but this is day 1 of pain, she also has clear breath sounds although diminished but likely secondary to splinting will rule out pneumothorax versus pneumonia versus pleural effusion versus pulmonary embolus check a D-dimer check electrolytes check cardiac labs and BNP.  We will treat pain with Toradol.  We will continue to monitor.  She is satting well and in no respiratory distress 38-year-old female past medical history of pulmonary work-up.  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  In the span of this past year she has had a negative CAT scan of her neck done and negative CT chest done, she has seen a pulmonary specialist who started her on an MDI with rescue inhaler.  Patient notes for the last 3 weeks she has developed intermittent posttussive vomiting associated with cough and shortness of breath and bilateral rib pain right greater than left which is increasingly worsening for several weeks.  She denies associated chest pain, syncope, palpitations, abdominal pain, nausea vomiting or diarrhea at this time.  She denies any recent antibiotics or hospital admissions.  Denies recent travel, denies history of DVT or PE.  She is a non-smoker denies drugs or alcohol.    My plan is to rule out PE versus pleurisy versus pleural effusion versus pneumonia my suspicion is low for cardiac pericardial effusion, my suspicion is low for zoster or intra-abdominal process.  Patient is tachycardic will check D-dimer but likely do CTA of chest as she is PERC and Wells score positive, also rule out intra-abdominal endocrine tumor CT abdomen pelvis to be done in the ED.  I do not suspect acute infectious process in the abdomen.    Medications treat with Toradol DuoNeb and steroid dose here in the ED.

## 2023-10-05 NOTE — ED PROVIDER NOTE - CARE PROVIDER_API CALL
Sudhakar Pham [facility]  Pulmonary Disease  [address] [place] [id number]  Phone: [phone]  Fax: [phone]  Follow Up Time:     Mick Cabrera  Otolaryngology  [address], [address]  [place] [address]  Phone: [phone]  Fax: [phone]  Follow Up Time:     Jade Tapia  Otolaryngology  [address], [address]  [place] [address]  Phone: [phone]  Fax: [phone]  Follow Up Time:

## 2023-10-05 NOTE — ED PROVIDER NOTE - PATIENT PORTAL LINK FT
You can access the FollowMyHealth Patient Portal offered by [facility] by registering at the following website: [email or web address]. By joining [employer]’s FollowMyHealth portal, you will also be able to view your health information using other applications (apps) compatible with our system.

## 2023-10-05 NOTE — ED ADULT NURSE REASSESSMENT NOTE - NS ED NURSE REASSESS COMMENT FT1
Patient resting quietly. Mother at bedside. Denies any HA or dizziness. Free form nay complaints of paint at this time. Breathing easily on RA. Tolerating po's well. Pending further eval.

## 2023-10-05 NOTE — ED PROVIDER NOTE - OBJECTIVE STATEMENT
38-year-old female past medical history of depression and hypertension stopped both her medications 1 month ago and she is unsure of the names of the medications, quit smoking 8 years ago, who presents with left posterior and lateral rib pain and chest pain "lung pain "with deep inspiration.  Patient notes 2 weeks ago her doctor, PCP, Aron Patel did a comprehensive nuclear stress test because she was experiencing irregular palpitations and chest pain.  Patient pulled up her MyChart from [facility] and noted negative nuclear stress testing done 2 weeks ago.  Patient denies associated chest pain at this time but is having pleuritic pain with deep inspiration associated with difficulty taking in a full breath.  Denies recent trauma, denies numbness paresthesias or tingling to the region denies vesicles or rash, denies abdominal pain, denies nausea vomiting diarrhea fever chills or persistent cough.  Denies prior history of similar pain.  Patient is a non-smoker, denies drugs or alcohol. 38-year-old female past medical history of pulmonary work-up.  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  In the span of this past year she has had a negative CAT scan of her neck done and negative CT chest done, she has seen a pulmonary specialist who started her on an MDI with rescue inhaler.  Patient notes for the last 3 weeks she has developed intermittent posttussive vomiting associated with cough and shortness of breath and bilateral rib pain right greater than left which is increasingly worsening for several weeks.  She denies associated chest pain, syncope, palpitations, abdominal pain, nausea vomiting or diarrhea at this time.  She denies any recent antibiotics or hospital admissions.  Denies recent travel, denies history of DVT or PE.  She is a non-smoker denies drugs or alcohol.

## 2023-10-05 NOTE — ED PROVIDER NOTE - NSFOLLOWUPINSTRUCTIONS_ED_ALL_ED_FT
Pleurisy  Body outline showing the lungs, with a close-up of the pleura. One pleura is normal, and the other is inflamed.  Pleurisy is when the lining of your lungs (pleura) swells and gets irritated. This can cause pain in your chest, back, or shoulder. You may also have trouble breathing.    What are the causes?  A lung infection.  A blood clot that goes into your lungs.  Injury to the chest.  Heart or chest surgery.  Certain medicines or treatment for cancer.  Certain diseases, such as lung cancer.  In some cases, the cause is not known.    What are the signs or symptoms?  Chest pain that may:  Be on one side of your body.  Be sharp and stabbing.  Get worse when you cough or breathe deep.  Trouble breathing.  Noisy breathing.  Cough.  Fever.  Coughing up blood.  Your symptoms may get worse when you lie down or lie on your side.    How is this treated?  You may need:  Medicines to treat swelling, pain, or a cough.  Antibiotics.  Blood thinners. You may need these if you have a blood clot.  To have the fluid or air taken out of your lungs with a tube.  Follow these instructions at home:  Medicines    Take over-the-counter and prescription medicines only as told by your doctor.  If you were prescribed antibiotics, take them as told by your doctor. Do not stop using them even if you start to feel better.  If you were prescribed medicines to remove fluid from your lungs (diuretics), take them as told by your doctor.  If you are taking blood thinners:  Talk with your doctor before taking any medicines that have aspirin or NSAIDs, such as ibuprofen.  Take medicines exactly as told. Take them at the same time each day.  Do not do things that could hurt or bruise you. Be careful to avoid falls.  Wear an alert bracelet or carry a card that says you take blood thinners.  Ask your doctor if you should avoid driving or using machines while you are taking your medicine.  Activity    Rest as told by your doctor.  Return to your normal activities when your doctor says that it is safe.  General instructions    Watch for any changes in how you feel.  Take deep breaths often, even if it hurts. This can help prevent lung problems.  You may be given a machine called an incentive spirometer. This can help you breathe better.  Do not smoke or use any products that contain nicotine or tobacco. If you need help quitting, ask your doctor.  Contact a doctor if:  You have pain that gets worse or happens more often.  Your pain does not get better with medicine.  You have a fever or chills.  Your cough or trouble breathing does not get better.  You cough up mucus or thick spit (phlegm) that looks like pus.  You cough up blood.  Get help right away if:  You have noisy breathing or more trouble breathing.  You have pain that spreads into your neck, arms, or jaw.  You feel faint or dizzy.  These symptoms may be an emergency. Get help right away. Call 911.  Do not wait to see if the symptoms will go away.  Do not drive yourself to the hospital.  This information is not intended to replace advice given to you by your health care provider. Make sure you discuss any questions you have with your health care provider.

## 2023-10-05 NOTE — ED PROVIDER NOTE - PROVIDER TOKENS
PROVIDER:[TOKEN:[[id number]]],PROVIDER:[TOKEN:[[id number]]],PROVIDER:[TOKEN:[[id number]]]

## 2023-10-05 NOTE — ED PROVIDER NOTE - PROGRESS NOTE DETAILS
While patient is CT attempting to obtain CT angio of the chest contrast infiltrated into the right antecubital IV line.  Patient who was immediately placed in elevated upper extremity and warm packs were placed.  Spoke to radiology resident Marilu who noted that the CT was suboptimal and unable to diagnose PE.  Patient will be returning to CT for full evaluation after new IV is placed All results were discussed with the patient she will obtain second opinions with pulmonary and ENT she is feeling better.  Arm was reexamined for infiltration and it is soft compartments +2 radial pulse on the right forearm, no erythema nontender on palpation.  Patient was advised to continue to keep the arm elevated and return if any signs of cellulitis develop or pain.  She denies numbness or paresthesias to right upper extremity.  Patient's questions were all answered I sent medications to the pharmacy for her she agrees with plan to discharge

## 2023-10-05 NOTE — ED PROVIDER NOTE - CARE PROVIDERS DIRECT ADDRESSES
[email or web address]

## 2023-10-06 LAB
CULTURE RESULTS: SIGNIFICANT CHANGE UP
SPECIMEN SOURCE: SIGNIFICANT CHANGE UP

## 2023-11-29 ENCOUNTER — APPOINTMENT (OUTPATIENT)
Dept: PULMONOLOGY | Facility: CLINIC | Age: 38
End: 2023-11-29